# Patient Record
Sex: FEMALE | Race: WHITE
[De-identification: names, ages, dates, MRNs, and addresses within clinical notes are randomized per-mention and may not be internally consistent; named-entity substitution may affect disease eponyms.]

---

## 2021-06-10 ENCOUNTER — HOSPITAL ENCOUNTER (OUTPATIENT)
Dept: HOSPITAL 96 - M.CL | Age: 71
Discharge: HOME | End: 2021-06-10
Attending: INTERNAL MEDICINE
Payer: MEDICARE

## 2021-06-10 VITALS — WEIGHT: 270 LBS | BODY MASS INDEX: 43.39 KG/M2 | HEIGHT: 66 IN

## 2021-06-10 VITALS — SYSTOLIC BLOOD PRESSURE: 131 MMHG | DIASTOLIC BLOOD PRESSURE: 59 MMHG

## 2021-06-10 DIAGNOSIS — Z98.890: ICD-10-CM

## 2021-06-10 DIAGNOSIS — E78.5: ICD-10-CM

## 2021-06-10 DIAGNOSIS — Z53.8: ICD-10-CM

## 2021-06-10 DIAGNOSIS — Z79.899: ICD-10-CM

## 2021-06-10 DIAGNOSIS — F32.9: ICD-10-CM

## 2021-06-10 DIAGNOSIS — I10: ICD-10-CM

## 2021-06-10 DIAGNOSIS — I25.10: Primary | ICD-10-CM

## 2021-06-10 DIAGNOSIS — E11.9: ICD-10-CM

## 2021-06-10 DIAGNOSIS — J44.9: ICD-10-CM

## 2021-06-10 LAB
ALBUMIN SERPL-MCNC: 3.5 G/DL (ref 3.4–5)
ALP SERPL-CCNC: 52 U/L (ref 46–116)
ALT SERPL-CCNC: 22 U/L (ref 30–65)
ANION GAP SERPL CALC-SCNC: 6 MMOL/L (ref 7–16)
APTT BLD: 26.8 SECONDS (ref 25–31.3)
AST SERPL-CCNC: 29 U/L (ref 15–37)
BILIRUB SERPL-MCNC: 0.2 MG/DL
BUN SERPL-MCNC: 24 MG/DL (ref 7–18)
CALCIUM SERPL-MCNC: 8.9 MG/DL (ref 8.5–10.1)
CHLORIDE SERPL-SCNC: 105 MMOL/L (ref 98–107)
CHOLEST SERPL-MCNC: 88 MG/DL (ref ?–200)
CO2 SERPL-SCNC: 33 MMOL/L (ref 21–32)
CREAT SERPL-MCNC: 1.1 MG/DL (ref 0.6–1.3)
GLUCOSE SERPL-MCNC: 120 MG/DL (ref 70–99)
HCT VFR BLD CALC: 35.5 % (ref 37–47)
HDLC SERPL-MCNC: 36 MG/DL (ref 40–?)
HGB BLD-MCNC: 10.7 GM/DL (ref 12–15)
INR PPP: 1.1
LDLC SERPL-MCNC: 35 MG/DL (ref ?–100)
MCH RBC QN AUTO: 19.9 PG (ref 26–34)
MCHC RBC AUTO-ENTMCNC: 30.2 G/DL (ref 28–37)
MCV RBC: 66 FL (ref 80–100)
MPV: 7 FL. (ref 7.2–11.1)
PLATELET COUNT*: 273 THOU/UL (ref 150–400)
POTASSIUM SERPL-SCNC: 5 MMOL/L (ref 3.5–5.1)
PROT SERPL-MCNC: 7.5 G/DL (ref 6.4–8.2)
PROTHROMBIN TIME: 11.2 SECONDS (ref 9.2–11.5)
RBC # BLD AUTO: 5.38 MIL/UL (ref 4.2–5)
RDW-CV: 18.1 % (ref 10.5–14.5)
SERUM ASSESSMENT: (no result)
SODIUM SERPL-SCNC: 144 MMOL/L (ref 136–145)
TC:HDL: 2.4 RATIO
TRIGL SERPL-MCNC: 89 MG/DL (ref ?–150)
VLDLC SERPL CALC-MCNC: 18 MG/DL (ref ?–40)
WBC # BLD AUTO: 8.6 THOU/UL (ref 4–11)

## 2021-06-10 NOTE — [place] [address]
Phone:  [phone]                     ELECTROCARDIOGRAM REPORT      
_______________________________________________________________________________
 
Name:         JENA BREWER            Room:                     REG CLI
M.R.#:    [id number]     Account #:     [id number]  
Admission:    06/10/21    Attend Phys:   Chris March,
Discharge:                Date of Birth: [birth year]  
Date of Service: 06/10/21 1058  Report #:      [id number]
        [id number]
_______________________________________________________________________________
THIS REPORT FOR:  //name//                      
 
                          [facility]
                                       
Test Date:    2021-06-10               Test Time:    10:58:57
Pat Name:     JENA BREWER         Department:   
Patient ID:   [id number]            Room:          
Gender:       F                        Technician:   
:          1950               Requested By: Chris March
Order Number: [id number]    Jesus MD:   Tolu Patricia
                                 Measurements
Intervals                              Axis          
Rate:         59                       P:            77
KS:           124                      QRS:          76
QRSD:         103                      T:            78
QT:           456                                    
QTc:          452                                    
                           Interpretive Statements
Sinus rhythm
Probable left atrial enlargement
Anterolateral ST segment depression; ischemia must be considered
No previous ECG available for comparison
Electronically Signed On 6- 16:34:08 CDT by Tolu Patricia
[email or web address]
 
 
 
 
 
 
 
 
 
 
 
 
 
 
 
 
 
 
 
 
  <ELECTRONICALLY SIGNED>
                                           By: Tolu Patricia MD, [facility]     
  06/10/21     1634
D: 06/10/21 1058   _____________________________________
T: 06/10/21 1058   Tolu Patricia MD, [facility]       /EPI

## 2021-06-15 ENCOUNTER — HOSPITAL ENCOUNTER (OUTPATIENT)
Dept: HOSPITAL 96 - M.CL | Age: 71
Discharge: HOME | End: 2021-06-15
Attending: INTERNAL MEDICINE
Payer: MEDICARE

## 2021-06-15 VITALS — WEIGHT: 270 LBS | HEIGHT: 66 IN | BODY MASS INDEX: 43.39 KG/M2

## 2021-06-15 VITALS — DIASTOLIC BLOOD PRESSURE: 84 MMHG | SYSTOLIC BLOOD PRESSURE: 160 MMHG

## 2021-06-15 VITALS — SYSTOLIC BLOOD PRESSURE: 136 MMHG | DIASTOLIC BLOOD PRESSURE: 61 MMHG

## 2021-06-15 VITALS — SYSTOLIC BLOOD PRESSURE: 128 MMHG | DIASTOLIC BLOOD PRESSURE: 53 MMHG

## 2021-06-15 DIAGNOSIS — I25.10: ICD-10-CM

## 2021-06-15 DIAGNOSIS — F32.9: ICD-10-CM

## 2021-06-15 DIAGNOSIS — Z98.49: ICD-10-CM

## 2021-06-15 DIAGNOSIS — R94.39: Primary | ICD-10-CM

## 2021-06-15 DIAGNOSIS — I10: ICD-10-CM

## 2021-06-15 DIAGNOSIS — R06.00: ICD-10-CM

## 2021-06-15 DIAGNOSIS — E78.00: ICD-10-CM

## 2021-06-15 DIAGNOSIS — Z88.8: ICD-10-CM

## 2021-06-15 DIAGNOSIS — I25.82: ICD-10-CM

## 2021-06-15 DIAGNOSIS — E78.5: ICD-10-CM

## 2021-06-15 DIAGNOSIS — Z79.899: ICD-10-CM

## 2021-06-15 DIAGNOSIS — Z98.890: ICD-10-CM

## 2021-06-15 DIAGNOSIS — Z96.1: ICD-10-CM

## 2021-06-15 DIAGNOSIS — E11.9: ICD-10-CM

## 2021-06-15 DIAGNOSIS — J44.9: ICD-10-CM

## 2021-06-25 NOTE — [place] [address]                    HISTORY AND PHYSICAL          
_______________________________________________________________________________
 
Name:       JENA BREWER             Room:                      REG CLI 
M.R.#:  [id number]      Account #:      [id number]  
Admission:  06/15/21     Attend Phys:    Chris March MD
Discharge:               Date of Birth:  [birth year]  
         Report #: [id number]
                                                                     [id number]
_______________________________________________________________________________
THIS REPORT FOR:  
 
cc:  Derek Rivera MD, Vinod N. MD Liston, Michael J. MD [facility]                                         ~
 
DOC #: [id number]
 
cc:     MD Chris Mcdermott MD
ADMIT DATE: 06/15/2021
 
INDICATION:  Progressive symptoms suggestive of angina.
 
HISTORY OF PRESENT ILLNESS:  The patient is a very pleasant 71-year-old white 
female with history of coronary artery disease with percutaneous coronary 
intervention in 1996.  At that time, she had a stent placed to the LAD.  Her 
right coronary artery was noted to be occluded and filled by collaterals.  
Cardiac risk factors include dyslipidemia, type 2 diabetes mellitus, 
hypertension and remote history of tobacco use.  The patient has been having 
complaints of progressive dyspnea on exertion, even with typical light 
activities.  It is taking a longer time for her to catch her breath.  
Noninvasive stress testing suggests possible ischemia in the inferior wall.  The
patient is being brought to the hospital for left heart catheterization, 
coronary angiography and possible intervention.
 
PAST MEDICAL HISTORY:
1.  Coronary artery disease with previous intervention as outlined above.
2.  COPD.
3.  Type 2 diabetes mellitus.
4.  Hyperlipidemia.
5.  Hypertension.
 
PAST SURGICAL HISTORY:
1.  Previous left breast biopsy.
2.  Bilateral carpal tunnel release.
3.  Bilateral cataract extraction with intraocular lens implant.
4.  Tonsillectomy and adenoidectomy.
 
FAMILY HISTORY:  Noncontributory.
 
SOCIAL HISTORY:  The patient quit smoking remotely.  She does not drink alcohol.
 
ALLERGIES:  ABILIFY, CLARITIN, LIPITOR, LOVASTATIN, PRAVASTATIN, ZOCOR AND 
ZYRTEC.
 
 
 
 
[place] [address]                    HISTORY AND PHYSICAL          
_______________________________________________________________________________
 
Name:       JENA BREWER             Room:                      REG CLI 
M.R.#:  [id number]      Account #:      [id number]  
Admission:  06/15/21     Attend Phys:    Chris March MD
Discharge:               Date of Birth:  [birth year]  
         Report #: [id number]
                                                                     [id number]
_______________________________________________________________________________
 
CURRENT MEDICATIONS:  Alprazolam 1 mg t.i.d. p.r.n., aspirin 81 mg daily, 
Lotensin 10 mg daily, Tessalon Perles 100 mg 3 times daily as needed, biotin 10 
mg capsule daily, vitamin D3 25 mcg daily, Benadryl 25 mg q. 6 hours p.r.n., 
fenofibrate 160 mg p.o. daily, Norco 10/325 one tablet q. 8 hours p.r.n., 
magnesium oxide 250 mg daily, melatonin 10 mg at bedtime as needed, Robaxin 500 
mg q. 6 hours p.r.n. Lopressor 50 mg p.o. b.i.d., multivitamin 1 tablet daily, 
Naprosyn 220 mg q. 12 hours p.r.n. Nitrostat sublingual p.r.n., Patanol 
ophthalmic solution b.i.d., Omega 3, 6, 9 fatty acid 1 capsule daily, Prilosec 
20 mg daily, rosuvastatin 5 mg daily.  Zoloft 100 mg b.i.d., Zanaflex 4 mg 3 
times daily as needed.
 
REVIEW OF SYSTEMS:  A 14-point reviews of systems positive for chest discomfort,
dyspnea on exertion, palpitations, lower extremity swelling, joint pain, 
otherwise 14-point review of systems was unremarkable.
 
PHYSICAL EXAMINATION:
VITAL SIGNS:  Blood pressure 148/74, pulse is 68 and regular.
GENERAL:  This is a pleasant lady in no distress.  Mood and affect appropriate.
HEENT:  Extraocular muscles intact.  Mucous membranes are moist.
NECK:  Examination of the neck shows no jugular venous distention.  There are no
carotid bruits.
CHEST:  Examination of the chest reveals slightly diminished breath sounds 
without wheezes or rales.
HEART:  Reveals a regular rhythm without gallop or murmur.
ABDOMEN:  Reveals a protuberant abdomen that is soft and nontender.
EXTREMITIES:  Show no edema.
SKIN:  Dry.
 
IMPRESSION AND RECOMMENDATIONS:
1.  Coronary artery disease with symptoms to suggest progressive angina.  Stress
testing suggested possible inferior wall ischemia.  The patient is being brought
into the hospital for left heart catheterization and coronary angiography.  
Further intervention pending those results.
2.  Mixed hyperlipidemia.  Continue rosuvastatin and Zetia as tolerated with 
goal LDL of 70 or less.
3.  Hypertension.  Blood pressure appears to be fairly well controlled at 
present.  We will adjust medications as needed.
4.  Type 2 diabetes mellitus per primary physician.
 
MD XIOMY Bo[place] [place] 
[address] [place] [address]                    HISTORY AND PHYSICAL          
_______________________________________________________________________________
 
Name:       JENA BREWER             Room:                      REG CLI 
M.R.#:  [id number]      Account #:      [id number]  
Admission:  06/15/21     Attend Phys:    Chris March MD
Discharge:               Date of Birth:  [birth year]  
         Report #: [id number]
                                                                     [id number]
_______________________________________________________________________________
 
D: 06/24/2021 04:53 PM
T: 06/24/2021 05:19 PM
 
 
 
 
 
 
 
 
 
 
 
 
 
 
 
 
 
 
 
 
 
 
 
 
 
 
 
 
 
 
 
 
 
 
 
 
 
 
 
 
 
<ELECTRONICALLY SIGNED>
                                        By:  Chris March MD, FACC   
06/25/21     0916
D: 06/24/21 1553_______________________________________
T: 06/24/21 1619MicVal[facility] Centerellyn March MD, FACC      /nt

## 2021-06-29 NOTE — CARD
[address]  [address]                    CARDIAC CATH REPORT           
_______________________________________________________________________________
 
Name:       OSMANJENA CALLE RORY             Room:                      REG CLI 
MARASHBlane#:  [id number]      Account #:      [id number]  
Admission:  06/15/21     Attend Phys:    Chris March MD
Discharge:               Date of Birth:  [birth year]  
         Report #: [id number]
                                                                     [id number]
_______________________________________________________________________________
THIS REPORT FOR:  
 
cc:  Derek Rivera MD, Vinod N. MD Blick, David R. MD [facility]                                            ~
 
 
--------------- APPROVED REPORT --------------
 
 
Study performed:  06/15/2021 12:09:24
 
Patient Details
Patient Status: Out-Patient                  Room #: 
The patient is a 71 year-old female
 
Event Personnel
Chris March  Cardiologist, Nedra Zhang RN Circulator, Sabiha Augustin RTR Monitor, Ramona Mott RTR Scrub, Se Mix RTR 
Monitor, Kevin Florian  Interventional Cardiologist
 
Procedures Performed
Left Heart Cath w/or w/o Coronaries [id number] [facility] FFR [id number] FFR 
Hemostasis w/ Mynx
 
Indication
Dyspnea, Positive stress test
 
Risk Factors
Hypercholesterolemia, Hypertension
 
Admission/Lab Medications/Medications given during procedure
Heparin Unfract., Oxygen Nasal cannula 2 l per min, Lidocaine Subcut 
15 ml, Midazolam (Versed) IV 1 mg, Fentanyl IV 25 mcg, Heparin IV 
5000 units, Nitroglycerin  mcg, Adenosine IV 26.23 
mg
 
Procedure Narrative
The patient was brought electively to the Cardiac Catheterization 
Laboratory and was prepped and draped in a sterile manner. The right 
femoral was infiltrated with 2% Lidocaine subcutaneous anesthesia. A 
[place] 6 FR sheath was inserted into the right femoral artery. 
Coronary angiography was performed using coronary diagnostic 
catheters. The right coronary system was accessed and visualized with 
a Diagnostic JR4 6Fr catheter. The left coronary system was accessed 
and visualized with a Diagnostic JL4 6Fr catheter. The left ventricle 
 
 
 
[place] [address]                    CARDIAC CATH REPORT           
_______________________________________________________________________________
 
Name:       JENA BREWER             Room:                      REG CLI 
M.R.#:  [id number]      Account #:      [id number]  
Admission:  06/15/21     Attend Phys:    Chris March MD
Discharge:               Date of Birth:  [birth year]  
         Report #: [id number]
                                                                     [id number]
_______________________________________________________________________________
 
was accessed and visualized with a Diagnostic JR4 6Fr catheter. Left 
ventricular/Aortic Valve gradient assessed via catheter pullback. 
Closure device was deployed with a 6 Fr Mynx. The patient tolerated 
the procedure well and there were no complications associated with 
the procedure. There was no hematoma.
 
Intraoperative Conscious Sedation
Sedation start time:  1255           Case end Time:  
1327    
Fentanyl  25 mcg    Versed  1 mg  
 
Fluoro Time:    5.3 minutes     
Dose:     DAP 93422 cGycm2  1169.23 mGy  
Contrast Type and Amount:  Visipaque 110 ml    
 
Diagnostic Cath
Left Main The left main coronary artery is normal and bifurcates into 
a left anterior descending and circumflex coronary artery.
LAD  The left anterior descending coronary artery has a widely patent 
stent in its midportion.  The remainder of the vessel is free of 
significant disease.
Diagonal 1 A large first diagonal branch has 40% narrowing 
proximally.
Diagonal 2 A large second diagonal branch is normal.
Circumflex The circumflex coronary artery has a 60% narrowing 
proximally.  The mid vessel has mild 10% narrowing.
OM1  A small first obtuse marginal branch is mildly plaqued.
OM2  A large branch second obtuse marginal branch has minimal 10% 
plaquing proximally.
OM3  A moderate size third obtuse marginal branch is free of 
significant disease.
Right Coronary The right coronary artery is totally occluded 
proximally.  The mid vessel, posterolateral branch and PDA fills by 
right to left and left to left collaterals.
R PDA  Faintly visualized by left to right collaterals.
RPLV  Faintly visualized by right to right and left-to-right 
collaterals.
 
Left Ventriculography
Left Ventriculography was not performed.     
 
IVUS
Fractional Flow Reserve was performed on the proximal circumflex 
artery segment vessel.
 
 
 
 
[place] [address]                    CARDIAC CATH REPORT           
_______________________________________________________________________________
 
Name:       JENA BREWER             Room:                      REG CLI 
M.R.#:  [id number]      Account #:      [id number]  
Admission:  06/15/21     Attend Phys:    Chris March MD
Discharge:               Date of Birth:  [birth year]  
         Report #: [id number]
                                                                     [id number]
_______________________________________________________________________________
 
Hemodynamics
The aortic pressure is 156/65 mmHg with a mean of 64 mmHg. The left 
ventricular pressure is 175/10 mmHg with a mean of mmHg. The left 
ventricular end diastolic pressure is 21 mmHg. 
 
PCI Technique Lesion
Anticoagulation was achieved with Heparin. Percutaneous coronary 
intervention was performed on the poroximal circumflex artery 
segment. The lesion stenosis prior to intervention was 60% with JERICHO 
3 flow. A 6FR XB 3.5 100CM Guide Catheter was used to engage the Left 
ostium. A Pressure Wire 175cm Interventional Guidewire was used to 
cross the lesion.
 
Final angiography reveals 60 % stenosis with JERICHO 3 
flow.
 
COMMENTS
An FFR was conducted on the proximal Circumflex Artery. The baseline 
reading was 0.99, and the final reading after IV Adenosine was 
administered for 2 minutes was 0.94.  Therefore, intervention was not 
performed.
 
PCI Technique Lesion
The lesion stenosis prior to intervention was proximal circumflex 
artery segment% with JERICHO flow.
 
Conclusion
1.  Chronically occluded right coronary artery that fills distally by 
right to right and left to right collaterals.
2.  Widely patent stents in the mid left anterior sending coronary 
artery.
3.  Moderate nonocclusive 60% narrowing of the proximal circumflex.  
Lesion evaluated with FFR flow wire.  
4.  Moderately elevated left and ventricular diastolic pressure 
consistent with acute diastolic heart failure. 
 
Recommendations
1.  Continue medical management and aggressive risk factor 
modification.
 
Diagnostic Cath Approved by: Chris March MD        Date/Time:
 
 
<ELECTRONICALLY SIGNED>
                                        By:  Kevin Florian MD, FACC      
06/21/21 1211
D: 06/21/21 1211_______________________________________
T: 06/21/21 1211Dphilip Florian MD, FACC         /INF